# Patient Record
Sex: FEMALE | Race: BLACK OR AFRICAN AMERICAN
[De-identification: names, ages, dates, MRNs, and addresses within clinical notes are randomized per-mention and may not be internally consistent; named-entity substitution may affect disease eponyms.]

---

## 2020-08-06 ENCOUNTER — HOSPITAL ENCOUNTER (EMERGENCY)
Dept: HOSPITAL 35 - ER | Age: 20
Discharge: HOME | End: 2020-08-06
Payer: COMMERCIAL

## 2020-08-06 VITALS — WEIGHT: 205.01 LBS | BODY MASS INDEX: 36.32 KG/M2 | HEIGHT: 63 IN

## 2020-08-06 VITALS — SYSTOLIC BLOOD PRESSURE: 113 MMHG | DIASTOLIC BLOOD PRESSURE: 47 MMHG

## 2020-08-06 DIAGNOSIS — F32.9: ICD-10-CM

## 2020-08-06 DIAGNOSIS — Z79.899: ICD-10-CM

## 2020-08-06 DIAGNOSIS — F41.0: Primary | ICD-10-CM

## 2020-08-06 NOTE — EKG
[facility]
[place]   [address]                     ELECTROCARDIOGRAM REPORT      
_______________________________________________________________________________
 
Name:       YANNI ENGEL                Room #:                     DEP [facility]#:      [id number]                       Account #:      [id number]  
Admission:  20    Attend Phys:                          
Discharge:  20    Date of Birth:  [birth year]  
                                                          Report #: [id number]
                                                                    [id number]
_______________________________________________________________________________
THIS REPORT FOR:  
 
cc:  [facility] physician unknown
     [facility] physician unknown
     Prakash Hartley MD                                            ~
THIS REPORT FOR:   //name//                          
 
                         [facility] ED
                                       
Test Date:    2020               Test Time:    01:08:46
Pat Name:     YANNI ENGEL             Department:   
Patient ID:   [id number]            Room:          
Gender:                               Technician:   [facility]
:          2000               Requested By: Raul Gabriel
Order Number: [id number] MD:   Prakash Hartley
                                 Measurements
Intervals                              Axis          
Rate:         74                       P:            45
MS:           185                      QRS:          50
QRSD:         83                       T:            30
QT:           360                                    
QTc:          400                                    
                           Interpretive Statements
Sinus rhythm
No previous ECG available for comparison
Electronically Signed On 2020 8:28:49 CDT by Prakash Hartley
[email or web address]
 
 
 
 
 
 
 
 
 
 
 
 
 
 
 
 
 
  <ELECTRONICALLY SIGNED>
   By: Prakash Hartley MD        
  20     0828
D: 20 0108                           _____________________________________
T: 20 0108                           MD [facility]